# Patient Record
Sex: MALE | Race: WHITE | NOT HISPANIC OR LATINO | URBAN - METROPOLITAN AREA
[De-identification: names, ages, dates, MRNs, and addresses within clinical notes are randomized per-mention and may not be internally consistent; named-entity substitution may affect disease eponyms.]

---

## 2020-01-14 ENCOUNTER — EMERGENCY (EMERGENCY)
Facility: HOSPITAL | Age: 30
LOS: 1 days | Discharge: HOME | End: 2020-01-14
Attending: EMERGENCY MEDICINE | Admitting: EMERGENCY MEDICINE
Payer: COMMERCIAL

## 2020-01-14 VITALS
HEIGHT: 69 IN | HEART RATE: 87 BPM | DIASTOLIC BLOOD PRESSURE: 74 MMHG | RESPIRATION RATE: 17 BRPM | OXYGEN SATURATION: 98 % | SYSTOLIC BLOOD PRESSURE: 121 MMHG | WEIGHT: 190.04 LBS | TEMPERATURE: 98 F

## 2020-01-14 VITALS
OXYGEN SATURATION: 94 % | TEMPERATURE: 98 F | SYSTOLIC BLOOD PRESSURE: 153 MMHG | DIASTOLIC BLOOD PRESSURE: 70 MMHG | HEART RATE: 83 BPM | RESPIRATION RATE: 18 BRPM

## 2020-01-14 PROCEDURE — 99282 EMERGENCY DEPT VISIT SF MDM: CPT | Mod: 25

## 2020-01-14 PROCEDURE — 12001 RPR S/N/AX/GEN/TRNK 2.5CM/<: CPT

## 2020-01-14 NOTE — ED PROVIDER NOTE - PHYSICAL EXAMINATION
Physical Exam  GEN: Awake, alert, non-toxic appearing, NCAT  EYES: full EOMI,  ENT: External inspection normal, normal voice,   HEAD: atraumatic  NECK: FROM neck, supple,   CV: no calf swelling, no calf tenderness, bleeding from medial aspect of proximal calf  RESP: no tachypnea, no hypoxia, no resp distress,  MSK: soft compartment of calf, nontender, no swelling,   SKIN: see CV exam

## 2020-01-14 NOTE — ED ADULT TRIAGE NOTE - CHIEF COMPLAINT QUOTE
BIBA ambulatory complaining of a wound to left lower extremity, states he had a scab there for ten years that came off.

## 2020-01-14 NOTE — ED PROVIDER NOTE - PATIENT PORTAL LINK FT
You can access the FollowMyHealth Patient Portal offered by [facility] by registering at the following website: [email or web address]. By joining [employer]’s FollowMyHealth portal, you will also be able to view your health information using other applications (apps) compatible with our system.

## 2020-01-14 NOTE — ED PROVIDER NOTE - CLINICAL SUMMARY MEDICAL DECISION MAKING FREE TEXT BOX
pt w/ known skin lesion, mild preceding trauma causing lesion to rip off and bleed, now controlled after stitches, encouraged pt to see derm this Thursday, wound care info,

## 2020-01-14 NOTE — ED PROVIDER NOTE - OBJECTIVE STATEMENT
29 yom pw bleeding from lle.  pt states he had a piece of skin lesion on his calf for almost 10 yrs, no change, no pain, no bleeding, however, tonight, it appeared it was somewhat coming off, and when he put his jeans on, the friction from the jeans ripped the piece of skin off, and now is bleeding.  due to see derm this Thursday.  no pain.  no other complaints.

## 2020-01-14 NOTE — ED PROVIDER NOTE - NSFOLLOWUPINSTRUCTIONS_ED_ALL_ED_FT
Follow up with your primary care doctor/dermatologist this Thursday per your appointment  Keep the wound dry and clean for 48 hours  Then you can gently clean with soap and water  Do NOT rub/scrub as it can open up the wound  After cleaning, apply topical bacitracin/neosporin  Return in 7-10 days to have the sutures/staples removed   Return immediately for any new or worsening symptoms or any new concerns

## 2020-01-14 NOTE — ED ADULT NURSE NOTE - OBJECTIVE STATEMENT
Pt is a 29y male complaining of wound check. Pt states that he had a scab on his left lower leg that had a scab on it for 10 yrs. Pt states that today the scab fell off and started shooting blood. Pt upgraded in triage.

## 2020-01-21 DIAGNOSIS — Z48.00 ENCOUNTER FOR CHANGE OR REMOVAL OF NONSURGICAL WOUND DRESSING: ICD-10-CM

## 2020-01-21 DIAGNOSIS — L98.9 DISORDER OF THE SKIN AND SUBCUTANEOUS TISSUE, UNSPECIFIED: ICD-10-CM

## 2020-12-08 PROBLEM — Z00.00 ENCOUNTER FOR PREVENTIVE HEALTH EXAMINATION: Status: ACTIVE | Noted: 2020-12-08

## 2020-12-09 ENCOUNTER — APPOINTMENT (OUTPATIENT)
Dept: OTOLARYNGOLOGY | Facility: CLINIC | Age: 30
End: 2020-12-09
Payer: COMMERCIAL

## 2020-12-09 VITALS — WEIGHT: 200 LBS | TEMPERATURE: 98.2 F | HEIGHT: 70 IN | BODY MASS INDEX: 28.63 KG/M2

## 2020-12-09 DIAGNOSIS — J34.2 DEVIATED NASAL SEPTUM: ICD-10-CM

## 2020-12-09 DIAGNOSIS — R06.83 SNORING: ICD-10-CM

## 2020-12-09 PROCEDURE — 31231 NASAL ENDOSCOPY DX: CPT

## 2020-12-09 PROCEDURE — 99072 ADDL SUPL MATRL&STAF TM PHE: CPT

## 2020-12-09 PROCEDURE — 99203 OFFICE O/P NEW LOW 30 MIN: CPT | Mod: 25

## 2020-12-09 RX ORDER — ESOMEPRAZOLE MAGNESIUM 5 MG/1
GRANULE, DELAYED RELEASE ORAL
Refills: 0 | Status: ACTIVE | COMMUNITY

## 2020-12-09 RX ORDER — AZELASTINE HYDROCHLORIDE 205.5 UG/1
0.15 SPRAY, METERED NASAL
Qty: 1 | Refills: 5 | Status: ACTIVE | COMMUNITY
Start: 2020-12-09 | End: 1900-01-01

## 2020-12-09 NOTE — ASSESSMENT
[FreeTextEntry1] : Chronic nasal congestion, likely allergic rhinitis. Recommended adding Astelin 2 consistent use of Flonase. We will perform allergy testing. He may benefit from sublingual immunotherapy.\par \par Snoring, no witnessed apneas. We discussed but decided against a sleep study. He understands at weight loss and is likely contributing to his worsening snoring and will likely resolve when he loses weight. He is already underway\par \par FU 1 month

## 2020-12-09 NOTE — HISTORY OF PRESENT ILLNESS
[de-identified] : Patient reports history of intermittent nasal congestion and possible allergies. He has never had allergy testing. He has used Flonase inconsistently with no improvement.  Has one sinus infection every 2 years treated with antibiotics. Over the past 6 months he reports increasing loudness of his snoring, witnessed by his girlfriend but no apneas, gasping,, or daytime somnolence. He has never had a sleep study. He has gained 20-30 pounds since March, when CoVid started

## 2023-01-27 ENCOUNTER — APPOINTMENT (OUTPATIENT)
Dept: OTOLARYNGOLOGY | Facility: CLINIC | Age: 33
End: 2023-01-27

## 2023-02-07 ENCOUNTER — APPOINTMENT (OUTPATIENT)
Dept: OTOLARYNGOLOGY | Facility: CLINIC | Age: 33
End: 2023-02-07
Payer: COMMERCIAL

## 2023-02-07 DIAGNOSIS — H69.83 OTHER SPECIFIED DISORDERS OF EUSTACHIAN TUBE, BILATERAL: ICD-10-CM

## 2023-02-07 DIAGNOSIS — Z78.9 OTHER SPECIFIED HEALTH STATUS: ICD-10-CM

## 2023-02-07 PROCEDURE — 99213 OFFICE O/P EST LOW 20 MIN: CPT

## 2023-02-07 RX ORDER — AZELASTINE HYDROCHLORIDE 137 UG/1
0.1 SPRAY, METERED NASAL TWICE DAILY
Qty: 1 | Refills: 3 | Status: ACTIVE | COMMUNITY
Start: 2023-02-07 | End: 1900-01-01

## 2023-02-07 NOTE — HISTORY OF PRESENT ILLNESS
[de-identified] : YANICK BURTON is a 32 year old patient here for ear pressure and sensation of eustachian tube dysfunction.  He flew in late December.  He had ear pressure and feels like his ears did not pop.  He took a Z-Milton which helped a little bit.  He has a little bit of a tingling sensation in the ears and itching.  He still has mild ear pressure.  He has no otalgia, otorrhea, tinnitus, or dizziness.  He denies sinus pain, sinus pressure, or nasal drainage.  He does complain of green phlegm.  He has a history of reflux and has been on Nexium.  He does use Q-tips in the ears\par \par He has not had allergy testing\par No history of nasal trauma or nasal/sinus surgery\par He does not have a history of recurrent sinus infections\par He has a history of reflux and takes Nexium\par He does not have a history of chronic ear disease

## 2023-02-07 NOTE — ASSESSMENT
[FreeTextEntry1] : He has had a sensation of eustachian tube dysfunction since late December after flying.  On exam his ears look normal.  There was only scant cerumen on the left side which was removed.  There is no infection or obvious middle ear effusion. he does have a history of chronic rhinitis.  He was treated with antibiotics.  He does not have any sinus pain or pressure.\par \par He has a history of reflux and is on medication\par \par PLAN\par \par -findings and management options discussed in detail with the patient.  I discussed possible etiologies for his symptoms of ear pressure.  He may have eustachian tube dysfunction.  His symptoms could also be due to allergies or reflux.  Other possible etiologies include sinusitis, TMJ dysfunction, and atypical migraines.  My findings on exam potential causes of his symptoms and treatment plan were discussed in detail with the patient.  Without further evaluation including nasal endoscopy/flexible laryngoscopy and an audiogram, it is difficult to completely rule out eustachian tube dysfunction or sinus infection.  He refused these test today\par -good aural hygiene\par -avoid using cotton swabs in the ears\par -wax removal drops as needed. \par -I recommended an audiogram to check his hearing and middle ear pressures.  He refused.\par -I recommended nasal saline irrigations, nasal steroid spray, Astelin, and antihistamine or decongestant.\par -He was wondering if he could have a sinus infection.  I recommended nasal endoscopy and flexible endoscopy to rule this out and for further evaluation.  He declined that today.  Based on his symptoms, I would not place him back on antibiotics as he has no sinus pain or pressure.\par -He could consider a short burst of prednisone which may help with your pressure\par -I recommended allergy evaluation or serology testing for allergies.  He was given a prescription to get the lab testing.  He should call for the results\par -I recommended reflux precautions.  He was given literature regarding reflux.  He is on Nexium.  I cautioned him about risks of prolonged usage of a PPI.  He could consider switching to Pepcid.  I also recommended GI evaluation to rule out precancerous esophageal changes\par -It is unclear what is causing the green phlegm.  Nasal endoscopy and flexible endoscopy would be helpful to rule out a sinus infection and for further evaluation.  He may speak with his PCP to ensure there is no pulmonary source\par -He will could also consider CT scan of his sinuses\par -I recommended follow-up or asked him to call me in the next 1 to 2 weeks if his symptoms or not improving.  We will discuss the next step in management.\par -call and return earlier if any concerns or worsening symptoms\par

## 2023-03-02 ENCOUNTER — APPOINTMENT (OUTPATIENT)
Dept: OTOLARYNGOLOGY | Facility: CLINIC | Age: 33
End: 2023-03-02
Payer: COMMERCIAL

## 2023-03-02 VITALS — WEIGHT: 200 LBS | HEIGHT: 70 IN | BODY MASS INDEX: 28.63 KG/M2

## 2023-03-02 DIAGNOSIS — K21.9 GASTRO-ESOPHAGEAL REFLUX DISEASE W/OUT ESOPHAGITIS: ICD-10-CM

## 2023-03-02 DIAGNOSIS — H93.293 OTHER ABNORMAL AUDITORY PERCEPTIONS, BILATERAL: ICD-10-CM

## 2023-03-02 DIAGNOSIS — J31.0 CHRONIC RHINITIS: ICD-10-CM

## 2023-03-02 PROCEDURE — 99213 OFFICE O/P EST LOW 20 MIN: CPT | Mod: 25

## 2023-03-02 PROCEDURE — 92557 COMPREHENSIVE HEARING TEST: CPT

## 2023-03-02 PROCEDURE — 31231 NASAL ENDOSCOPY DX: CPT

## 2023-03-02 PROCEDURE — 92567 TYMPANOMETRY: CPT

## 2023-03-02 RX ORDER — PREDNISONE 10 MG/1
10 TABLET ORAL
Qty: 12 | Refills: 0 | Status: ACTIVE | COMMUNITY
Start: 2023-03-02 | End: 1900-01-01

## 2023-03-02 RX ORDER — FAMOTIDINE 40 MG/1
40 TABLET, FILM COATED ORAL
Qty: 30 | Refills: 3 | Status: ACTIVE | COMMUNITY
Start: 2023-03-02 | End: 1900-01-01

## 2023-03-02 NOTE — HISTORY OF PRESENT ILLNESS
[de-identified] : YANICK BURTON is a 32 year man with a history of barotrauma in December. He has had ear congested. He uses Nexium. Patient informed

## 2023-03-02 NOTE — REVIEW OF SYSTEMS
[Negative] : Heme/Lymph [Patient Intake Form Reviewed] : Patient intake form was reviewed [Negative] : Genitourinary

## 2023-04-06 ENCOUNTER — APPOINTMENT (OUTPATIENT)
Dept: OTOLARYNGOLOGY | Facility: CLINIC | Age: 33
End: 2023-04-06

## 2025-06-03 NOTE — ED ADULT TRIAGE NOTE - ESI TRIAGE ACUITY LEVEL, MLM
Problem: Discharge Planning  Goal: Discharge to home or other facility with appropriate resources  6/2/2025 2348 by Mikki Baker RN  Outcome: Progressing  6/2/2025 1721 by Chrystal Pope RN  Outcome: Progressing     Problem: Pain  Goal: Verbalizes/displays adequate comfort level or baseline comfort level  6/2/2025 2348 by Mikki Baker RN  Outcome: Progressing  6/2/2025 1721 by Chrystal Pope RN  Outcome: Progressing     Problem: Safety - Adult  Goal: Free from fall injury  6/2/2025 2348 by Mikki Baker RN  Outcome: Progressing  6/2/2025 1721 by Chrystal Pope RN  Outcome: Progressing     Problem: ABCDS Injury Assessment  Goal: Absence of physical injury  6/2/2025 2348 by Mikki Baker RN  Outcome: Progressing  6/2/2025 1721 by Chrystal Pope RN  Outcome: Progressing      3